# Patient Record
Sex: FEMALE | URBAN - METROPOLITAN AREA
[De-identification: names, ages, dates, MRNs, and addresses within clinical notes are randomized per-mention and may not be internally consistent; named-entity substitution may affect disease eponyms.]

---

## 2022-08-01 ENCOUNTER — APPOINTMENT (OUTPATIENT)
Dept: URBAN - METROPOLITAN AREA CLINIC 256 | Age: 67
Setting detail: DERMATOLOGY
End: 2022-08-01

## 2022-08-01 DIAGNOSIS — Z41.9 ENCOUNTER FOR PROCEDURE FOR PURPOSES OTHER THAN REMEDYING HEALTH STATE, UNSPECIFIED: ICD-10-CM

## 2022-08-01 PROCEDURE — OTHER BOTOX (U OR CC): OTHER

## 2022-08-01 NOTE — PROCEDURE: BOTOX (U OR CC)
Price (Use Numbers Only, No Special Characters Or $): 002 Price (Use Numbers Only, No Special Characters Or $): 738

## 2022-08-01 NOTE — PROCEDURE: BOTOX (U OR CC)
Consent: - Verbal and written consent obtained. \\n- Discussed the risks that include but not limited to lid/brow ptosis, bruising, headache, asymmetry, swelling, diplopia, temporary effect, and incomplete chemical denervation.\\n- Advised that it can take up to 14 days for the full effect of the Botox to take place.\\n- Generally the effects of Botox are expected to last for 3-4 months, but length of duration can vary. Yes

## 2024-01-03 ENCOUNTER — LAB REQUISITION (OUTPATIENT)
Dept: LAB | Facility: CLINIC | Age: 69
End: 2024-01-03

## 2024-01-03 PROCEDURE — 88313 SPECIAL STAINS GROUP 2: CPT | Mod: TC | Performed by: DENTIST
